# Patient Record
Sex: MALE | URBAN - METROPOLITAN AREA
[De-identification: names, ages, dates, MRNs, and addresses within clinical notes are randomized per-mention and may not be internally consistent; named-entity substitution may affect disease eponyms.]

---

## 2018-04-21 ENCOUNTER — IMPORTED ENCOUNTER (OUTPATIENT)
Dept: URBAN - METROPOLITAN AREA CLINIC 1 | Facility: CLINIC | Age: 58
End: 2018-04-21

## 2018-04-21 PROBLEM — H17.822: Noted: 2018-04-21

## 2018-04-21 PROBLEM — H25.813: Noted: 2018-04-21

## 2018-04-21 PROCEDURE — 92004 COMPRE OPH EXAM NEW PT 1/>: CPT

## 2018-04-21 PROCEDURE — 92015 DETERMINE REFRACTIVE STATE: CPT

## 2018-04-21 NOTE — PATIENT DISCUSSION
1.  Cataract OU:  Visually Significant secondary to glare discussed the risks benefits alternatives and limitations of cataract surgery. The patient stated a full understanding and a desire to proceed with the procedure. The patient will need to return for preop appointment with cataract measurements and to have any additional questions answered and start pre-operative eye drops as directed. Due to Posterior Polar Cataract consider LenSx OD only. No LenSx OS due to h/o K Scar from HSV. Phaco PCL OS then OD. **Posterior Polar Cat** (Otherwise follow-up 6 mo 10 glare) 2. 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 D/c po Valtrex. Continue ATs TID OU Routinely Cont At Gel misbah QHS OU. Message sent to schedule Phaco. Return for an appointment with Dr. Mena Nunn for AS/HP.

## 2019-01-21 ENCOUNTER — IMPORTED ENCOUNTER (OUTPATIENT)
Dept: URBAN - METROPOLITAN AREA CLINIC 1 | Facility: CLINIC | Age: 59
End: 2019-01-21

## 2019-01-21 PROBLEM — H25.813: Noted: 2019-01-21

## 2019-01-21 PROBLEM — H25.043: Noted: 2019-01-21

## 2019-01-21 PROCEDURE — 99213 OFFICE O/P EST LOW 20 MIN: CPT

## 2019-01-21 PROCEDURE — 92015 DETERMINE REFRACTIVE STATE: CPT

## 2019-01-21 NOTE — PATIENT DISCUSSION
1.  Cataracts w/ PSC / Posterior Polar OU -- Visually significant but will see if glasses MRx improves patients [facility] & continue to observe for now without intervention. The patient was advised to contact us if any change or worsening of vision2. 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 Finalized Glasses MRx was given to patient today. Return for an appointment in 6 MO for a 27 / Russ Law / MRx OU with Dr. Valente Ortega.

## 2019-10-01 ENCOUNTER — IMPORTED ENCOUNTER (OUTPATIENT)
Dept: URBAN - METROPOLITAN AREA CLINIC 1 | Facility: CLINIC | Age: 59
End: 2019-10-01

## 2019-10-01 PROBLEM — B00.52: Noted: 2019-10-01

## 2019-10-01 PROCEDURE — 92012 INTRM OPH EXAM EST PATIENT: CPT

## 2019-10-01 NOTE — PATIENT DISCUSSION
1.  HSV keratitis OS - Begin Zirgan Q2H OS (written rx and sample given). Begin Valtrex 500mg PO BID x 1 month disp 60 (written rx given). 2.  Cataracts w/ PSC / Posterior Polar OU - Observe Return for an appointment in 1 week 10 with Dr. Mena Nunn.

## 2019-10-08 ENCOUNTER — IMPORTED ENCOUNTER (OUTPATIENT)
Dept: URBAN - METROPOLITAN AREA CLINIC 1 | Facility: CLINIC | Age: 59
End: 2019-10-08

## 2019-10-08 PROBLEM — B00.52: Noted: 2019-10-08

## 2019-10-08 PROCEDURE — 92012 INTRM OPH EXAM EST PATIENT: CPT

## 2019-10-08 NOTE — PATIENT DISCUSSION
1.  HSV Keratitis OS: Improving - Use Zirgan 5xdaily OS continue Valtrex 500mg PO BID x 1 month disp 60 (written rx given). Compliance urged. 2.  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 Freeman Mcgraw.

## 2020-09-30 ENCOUNTER — IMPORTED ENCOUNTER (OUTPATIENT)
Dept: URBAN - METROPOLITAN AREA CLINIC 1 | Facility: CLINIC | Age: 60
End: 2020-09-30

## 2020-09-30 PROBLEM — H25.043: Noted: 2020-09-30

## 2020-09-30 PROBLEM — E11.9: Noted: 2020-10-16

## 2020-09-30 PROBLEM — H17.12: Noted: 2020-09-30

## 2020-09-30 PROBLEM — H52.31: Noted: 2020-09-30

## 2020-09-30 PROBLEM — Z79.84: Noted: 2020-10-16

## 2020-09-30 PROCEDURE — 92014 COMPRE OPH EXAM EST PT 1/>: CPT

## 2020-09-30 PROCEDURE — 92015 DETERMINE REFRACTIVE STATE: CPT

## 2020-09-30 NOTE — PATIENT DISCUSSION
1.  DM Type II without sign of diabetic retinopathy and no blot heme on dilated retinal examination today OU No Macular Edema:  Discussed the pathophysiology of diabetes and its effect on the eye and risk of blindness. Stressed the importance of strong glucose control. Advised of importance of at least yearly dilated examinations but to contact us immediately for any problems or concerns. 2. Cataract OU: Observe for now without intervention. The patient was advised to contact us if any change or worsening of vision. Not interested in cat surgery at this time3. 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 4. Anisometropia OU:Rx vanna5. Return for an appointment in 1 year for 30 with Dr. Amber Chapman.

## 2021-10-27 ENCOUNTER — IMPORTED ENCOUNTER (OUTPATIENT)
Dept: URBAN - METROPOLITAN AREA CLINIC 1 | Facility: CLINIC | Age: 61
End: 2021-10-27

## 2021-10-27 PROBLEM — H10.45: Noted: 2021-10-27

## 2021-10-27 PROBLEM — H25.813: Noted: 2021-10-27

## 2021-10-27 PROBLEM — H04.123: Noted: 2021-10-27

## 2021-10-27 PROCEDURE — 99213 OFFICE O/P EST LOW 20 MIN: CPT

## 2021-10-27 NOTE — PATIENT DISCUSSION
1.  Allergic Conjunctivitis OU -- The condition was  discussed with the patient. Recommend OTC Pataday Qd-BID OU PRN for itching. In addition begin Flarex (sample given) to use BID OU until gone. 2.  LANE w/ PEK OU -The use/continuation of artificial tears were recommended. 3.  K Scar OS -- H/o HSV Keratitis OS. Quiet today. 4.  Cataract OU (w/ PP component OU) --  Patient symptomatic and is interested in scheduling in near future. Will have patient return for dilated exam next appt. 5.  H/o DM Type II w/o DR. 5.  Return for an appointment in 1 week for a 30/glare with Dr. Gregor De La Cruz or Dr. Freeman Mcgraw.

## 2021-11-19 ENCOUNTER — IMPORTED ENCOUNTER (OUTPATIENT)
Dept: URBAN - METROPOLITAN AREA CLINIC 1 | Facility: CLINIC | Age: 61
End: 2021-11-19

## 2021-11-19 PROBLEM — Z79.84: Noted: 2021-11-19

## 2021-11-19 PROBLEM — H25.813: Noted: 2021-11-19

## 2021-11-19 PROBLEM — H25.043: Noted: 2021-11-19

## 2021-11-19 PROBLEM — H04.123: Noted: 2021-11-19

## 2021-11-19 PROBLEM — E11.9: Noted: 2021-11-19

## 2021-11-19 PROBLEM — H10.45: Noted: 2021-11-19

## 2021-11-19 PROBLEM — H16.143: Noted: 2021-11-19

## 2021-11-19 PROCEDURE — 92015 DETERMINE REFRACTIVE STATE: CPT

## 2021-11-19 PROCEDURE — 99214 OFFICE O/P EST MOD 30 MIN: CPT

## 2021-11-19 NOTE — PATIENT DISCUSSION
1.  DM Type II (oral meds) without sign of diabetic retinopathy and no blot heme on dilated retinal examination today OU No Macular Edema:  Discussed the pathophysiology of diabetes and its effect on the eye and risk of blindness. Stressed the importance of strong glucose control. Advised of importance of at least yearly dilated examinations but to contact us immediately for any problems or concerns. 2. Comb Cataracts OU w/ PSC OU - Visually Significant secondary to glare discussed the risks benefits alternatives and limitations of cataract surgery. The patient stated a full understanding and a desire to proceed with the procedure. The patient will need to return for preop appointment with cataract measurements and to have any additional questions answered and start pre-operative eye drops as directed. **PT DID NOT SEE PMG**Phaco PCL OS then ODOtherwise follow-up 6 months for DFE/glare3. LANE w/ PEK OU - Recommend the use of ATs TID OU routinely. 4. Allergic Conjunctivitis OU - Continue Pataday QD OU PRN itching. 5.  K Scar OS - H/o HSV Keratitis OS. Quiet today. Letter to PCP. Return for an appointment in AS/HP/10 with Dr. Sayda Landa.

## 2021-12-20 ENCOUNTER — IMPORTED ENCOUNTER (OUTPATIENT)
Dept: URBAN - METROPOLITAN AREA CLINIC 1 | Facility: CLINIC | Age: 61
End: 2021-12-20

## 2021-12-20 PROBLEM — E11.9: Noted: 2021-12-20

## 2021-12-20 PROBLEM — H25.813: Noted: 2021-12-20

## 2021-12-20 PROBLEM — H16.143: Noted: 2021-12-20

## 2021-12-20 PROBLEM — Z79.84: Noted: 2021-12-20

## 2021-12-20 PROBLEM — H04.123: Noted: 2021-12-20

## 2021-12-20 PROCEDURE — 92136 OPHTHALMIC BIOMETRY: CPT

## 2021-12-20 PROCEDURE — 92012 INTRM OPH EXAM EST PATIENT: CPT

## 2021-12-20 NOTE — PATIENT DISCUSSION
1. Cataract OU (w/ Posterior Polar/PSC component) --  Visually Significant OU discussed the risks benefits alternatives and limitations of cataract surgery. The patient stated a full understanding and a desire to proceed with the procedure. Discussed with patient if PO Gtts are more than $120 for all three combined when filling at their Pharmacy please call our office to request generic substitutions. Pt came to pre-op appointment today alone and Lifestyle Questionnaire Completed. Pt understands they will need glasses post-op to achieve their best corrected vision. *H/o HSV Keratitis OS. Phaco PCL OS then ODPhaco PCL OS 2. DM Type II (Oral Med) without sign of diabetic retinopathy and no blot heme on dilated retinal examination today OU No Macular Edema:  Discussed the pathophysiology of diabetes and its effect on the eye and risk of blindness. Stressed the importance of strong glucose control. Advised of importance of at least yearly dilated examinations but to contact us immediately for any problems or concerns. 3.  LANE w/ PEK OU -- cont the use of ATs TID OU routinely. 4. Allergic Conjunctivitis OU - Continue Pataday QD OU PRN itching. 5.  K Scar OS - H/o HSV Keratitis OS. Quiet today. Return for an appointment as sched with Dr. Dyan Ramirez.

## 2021-12-22 ENCOUNTER — IMPORTED ENCOUNTER (OUTPATIENT)
Dept: URBAN - METROPOLITAN AREA CLINIC 1 | Facility: CLINIC | Age: 61
End: 2021-12-22

## 2021-12-23 ENCOUNTER — IMPORTED ENCOUNTER (OUTPATIENT)
Dept: URBAN - METROPOLITAN AREA CLINIC 1 | Facility: CLINIC | Age: 61
End: 2021-12-23

## 2021-12-23 PROBLEM — H25.811: Noted: 2021-12-23

## 2021-12-23 PROCEDURE — 92136 OPHTHALMIC BIOMETRY: CPT

## 2021-12-23 NOTE — PATIENT DISCUSSION
1.  Cataract OD -- Visually Significant secondary to glare discussed the risks benefits alternatives and limitations of cataract surgery. The patient stated a full understanding and a desire to proceed with the procedure. Discussed with patient if PO Gtts are more than $120 for all three combined when filling at their Pharmacy please call our office to request generic substitutions. Pt understands they will need glasses post-op to achieve their best corrected vision. Phaco PCL OD (Standard)2. POD#1  CE/IOL OS (Standard) doing well. *H/o K Scar Use Zylet BID OS & Prolensa QD OS: Use gtts through completion of PO gtt chart regimen.  F/u as scheduled 2nd eye

## 2022-01-12 ENCOUNTER — IMPORTED ENCOUNTER (OUTPATIENT)
Dept: URBAN - METROPOLITAN AREA CLINIC 1 | Facility: CLINIC | Age: 62
End: 2022-01-12

## 2022-01-12 PROCEDURE — 66984 XCAPSL CTRC RMVL W/O ECP: CPT

## 2022-01-13 ENCOUNTER — IMPORTED ENCOUNTER (OUTPATIENT)
Dept: URBAN - METROPOLITAN AREA CLINIC 1 | Facility: CLINIC | Age: 62
End: 2022-01-13

## 2022-01-13 PROBLEM — Z96.1: Noted: 2022-01-13

## 2022-01-13 PROCEDURE — 99024 POSTOP FOLLOW-UP VISIT: CPT

## 2022-01-13 NOTE — PATIENT DISCUSSION
1. POD#1 Phaco/ PCL Standard OD- doing well. Use Zylet BID OD and Prolensa Qdaily OD: Use all three gtts through completion of PO gtt chart regimen/ Per our instructions given. Post op Warnings Reiterated 2. POW#3 Phaco/ PCL Standard OS- doing well. *H/o HSV OS.  Cont Zylet BID OS and Prolensa Qdaily OS RTC as scheduled

## 2022-04-09 ASSESSMENT — VISUAL ACUITY
OS_GLARE: 20/400
OS_SC: J1-2
OD_GLARE: 20/400
OS_CC: 20/40
OS_GLARE: 20/400
OS_GLARE: 20/400
OS_SC: 20/20
OD_GLARE: 20/200
OD_CC: 20/30+1
OS_GLARE: 20/400
OD_GLARE: 20/400
OD_GLARE: 20/400
OS_CC: 20/60
OS_CC: 20/30+1
OS_SC: J1-2
OD_SC: 20/25
OD_SC: J1+-2
OS_SC: 20/25-2
OS_CC: J2
OD_SC: 20/20-2
OD_GLARE: 20/200
OS_PH: SC 20/20 -2
OD_CC: J1
OD_SC: J1+-2
OD_SC: 20/30
OS_GLARE: 20/400
OS_GLARE: 20/400
OD_CC: 20/30+1
OD_CC: 20/60
OS_SC: 20/30
OD_CC: 20/30+1
OD_GLARE: 20/400
OD_SC: 20/20
OS_CC: 20/20-2
OD_CC: J1
OS_CC: 20/80
OD_GLARE: 20/200
OS_CC: 20/40+2
OS_CC: 20/40+2
OD_CC: 20/80
OS_GLARE: 20/400
OS_CC: J1
OS_SC: 20/60
OD_CC: 20/25
OD_CC: 20/30

## 2022-04-09 ASSESSMENT — KERATOMETRY
OS_AXISANGLE_DEGREES: 137
OS_K2POWER_DIOPTERS: 44.75
OD_AXISANGLE_DEGREES: 021
OS_K1POWER_DIOPTERS: 47.75
OD_AXISANGLE2_DEGREES: 111
OD_K1POWER_DIOPTERS: 42.75
OD_K2POWER_DIOPTERS: 43.25
OS_AXISANGLE2_DEGREES: 047

## 2022-04-09 ASSESSMENT — TONOMETRY
OD_IOP_MMHG: 17
OD_IOP_MMHG: 13
OS_IOP_MMHG: 14
OS_IOP_MMHG: 16
OS_IOP_MMHG: 14
OD_IOP_MMHG: 15
OD_IOP_MMHG: 16
OS_IOP_MMHG: 16
OD_IOP_MMHG: 15
OS_IOP_MMHG: 14
OD_IOP_MMHG: 14
OD_IOP_MMHG: 20
OS_IOP_MMHG: 16
OD_IOP_MMHG: 14
OS_IOP_MMHG: 17
OS_IOP_MMHG: 13
OD_IOP_MMHG: 14

## 2022-06-21 ENCOUNTER — POST-OP (OUTPATIENT)
Dept: URBAN - METROPOLITAN AREA CLINIC 1 | Facility: CLINIC | Age: 62
End: 2022-06-21

## 2022-06-21 DIAGNOSIS — Z96.1: ICD-10-CM

## 2022-06-21 PROCEDURE — 99024 POSTOP FOLLOW-UP VISIT: CPT

## 2022-06-21 ASSESSMENT — TONOMETRY
OS_IOP_MMHG: 14
OD_IOP_MMHG: 15

## 2022-06-21 ASSESSMENT — VISUAL ACUITY
OD_SC: 20/20
OS_SC: 20/25+

## 2023-09-11 ENCOUNTER — EMERGENCY VISIT (OUTPATIENT)
Dept: URBAN - METROPOLITAN AREA CLINIC 1 | Facility: CLINIC | Age: 63
End: 2023-09-11

## 2023-09-11 DIAGNOSIS — B00.52: ICD-10-CM

## 2023-09-11 PROCEDURE — 99213 OFFICE O/P EST LOW 20 MIN: CPT

## 2023-09-11 ASSESSMENT — VISUAL ACUITY
OS_SC: 20/80
OS_PH: 20/25
OD_SC: 20/20

## 2023-09-11 ASSESSMENT — TONOMETRY
OD_IOP_MMHG: 16
OS_IOP_MMHG: 15

## 2023-09-14 ENCOUNTER — FOLLOW UP (OUTPATIENT)
Dept: URBAN - METROPOLITAN AREA CLINIC 1 | Facility: CLINIC | Age: 63
End: 2023-09-14

## 2023-09-14 DIAGNOSIS — B00.52: ICD-10-CM

## 2023-09-14 PROCEDURE — 99213 OFFICE O/P EST LOW 20 MIN: CPT

## 2023-09-14 ASSESSMENT — VISUAL ACUITY
OD_SC: 20/20-1
OS_SC: 20/70
OS_PH: 20/30

## 2023-09-14 ASSESSMENT — TONOMETRY
OD_IOP_MMHG: 14
OS_IOP_MMHG: 19

## 2023-09-21 ENCOUNTER — FOLLOW UP (OUTPATIENT)
Dept: URBAN - METROPOLITAN AREA CLINIC 1 | Facility: CLINIC | Age: 63
End: 2023-09-21

## 2023-09-21 DIAGNOSIS — B00.52: ICD-10-CM

## 2023-09-21 PROCEDURE — 99213 OFFICE O/P EST LOW 20 MIN: CPT

## 2023-09-21 ASSESSMENT — TONOMETRY
OD_IOP_MMHG: 14
OS_IOP_MMHG: 14

## 2023-09-21 ASSESSMENT — VISUAL ACUITY
OS_SC: 20/40
OD_SC: 20/20

## 2025-02-03 ENCOUNTER — EMERGENCY VISIT (OUTPATIENT)
Age: 65
End: 2025-02-03

## 2025-02-03 DIAGNOSIS — B00.52: ICD-10-CM

## 2025-02-03 DIAGNOSIS — E11.9: ICD-10-CM

## 2025-02-03 PROCEDURE — 99214 OFFICE O/P EST MOD 30 MIN: CPT
